# Patient Record
Sex: MALE | URBAN - METROPOLITAN AREA
[De-identification: names, ages, dates, MRNs, and addresses within clinical notes are randomized per-mention and may not be internally consistent; named-entity substitution may affect disease eponyms.]

---

## 2023-02-07 ENCOUNTER — OFFICE VISIT (OUTPATIENT)
Dept: DENTISTRY | Facility: CLINIC | Age: 33
End: 2023-02-07

## 2023-02-07 DIAGNOSIS — K04.1 NECROTIC TOOTH: Primary | ICD-10-CM

## 2023-02-07 RX ORDER — AMOXICILLIN 500 MG/1
500 CAPSULE ORAL EVERY 8 HOURS SCHEDULED
Qty: 21 CAPSULE | Refills: 0 | Status: SHIPPED | OUTPATIENT
Start: 2023-02-07 | End: 2023-02-14

## 2023-02-07 NOTE — PROGRESS NOTES
Andrew Teran presents to clinic for ER appt  Due to pain in UL that has been happening for ~5 days  [facility], no changes  Pt  Is ASA1  Pt  Originally said pain was on tooth #13 that was RCT and crowned elsewhere ~1 year ago but endo testing reveals percussion pain on #9  Took PA of #13  No radiographic findings  Took PA #9  Note possible resorption on #9 with PAP noted on apex of #9  Endodontic testing reveals:  #9: Cold (-) percussion (+) palpation (WNL) - tooth is necrotic    Plan: RCT, cbu, crown #9    Pt  Wants to apply for sliding fee scale prior to tx      Rx: amoxicillin tid 7 days to [facility] on [address]    NV: RCT #9